# Patient Record
Sex: FEMALE | Race: ASIAN | NOT HISPANIC OR LATINO
[De-identification: names, ages, dates, MRNs, and addresses within clinical notes are randomized per-mention and may not be internally consistent; named-entity substitution may affect disease eponyms.]

---

## 2019-10-19 ENCOUNTER — IMAGING SERVICES (OUTPATIENT)
Dept: OTHER | Age: 44
End: 2019-10-19

## 2023-11-03 ENCOUNTER — OFFICE VISIT (OUTPATIENT)
Dept: RHEUMATOLOGY | Facility: CLINIC | Age: 48
End: 2023-11-03
Payer: COMMERCIAL

## 2023-11-03 VITALS
TEMPERATURE: 98 F | DIASTOLIC BLOOD PRESSURE: 66 MMHG | HEIGHT: 61 IN | RESPIRATION RATE: 16 BRPM | SYSTOLIC BLOOD PRESSURE: 108 MMHG | OXYGEN SATURATION: 99 % | BODY MASS INDEX: 29.83 KG/M2 | WEIGHT: 158 LBS | HEART RATE: 59 BPM

## 2023-11-03 DIAGNOSIS — E55.9 VITAMIN D DEFICIENCY: ICD-10-CM

## 2023-11-03 DIAGNOSIS — Z79.899 HIGH RISK MEDICATION USE: ICD-10-CM

## 2023-11-03 DIAGNOSIS — M05.9 SEROPOSITIVE RHEUMATOID ARTHRITIS (HCC): Primary | ICD-10-CM

## 2023-11-03 DIAGNOSIS — D84.821 IMMUNOCOMPROMISED STATE DUE TO DRUG THERAPY: ICD-10-CM

## 2023-11-03 DIAGNOSIS — I73.00 RAYNAUD'S DISEASE WITHOUT GANGRENE: ICD-10-CM

## 2023-11-03 DIAGNOSIS — Z79.899 IMMUNOCOMPROMISED STATE DUE TO DRUG THERAPY: ICD-10-CM

## 2023-11-03 DIAGNOSIS — R76.8 ANA POSITIVE: ICD-10-CM

## 2023-11-03 RX ORDER — METHOTREXATE 2.5 MG/1
15 TABLET ORAL WEEKLY
COMMUNITY
Start: 2023-10-24 | End: 2023-11-03

## 2023-11-03 RX ORDER — LEFLUNOMIDE 10 MG/1
10 TABLET ORAL DAILY
Qty: 90 TABLET | Refills: 0 | Status: SHIPPED | OUTPATIENT
Start: 2023-11-03

## 2023-11-03 RX ORDER — FOLIC ACID 1 MG/1
1 TABLET ORAL DAILY
COMMUNITY
Start: 2022-05-17 | End: 2023-11-03

## 2023-11-03 RX ORDER — FOLIC ACID 1 MG/1
1 TABLET ORAL DAILY
Qty: 90 TABLET | Refills: 0 | Status: SHIPPED | OUTPATIENT
Start: 2023-11-03

## 2023-11-29 ENCOUNTER — LAB ENCOUNTER (OUTPATIENT)
Dept: LAB | Facility: HOSPITAL | Age: 48
End: 2023-11-29
Attending: INTERNAL MEDICINE
Payer: COMMERCIAL

## 2023-11-29 DIAGNOSIS — Z79.899 HIGH RISK MEDICATION USE: ICD-10-CM

## 2023-11-29 DIAGNOSIS — R76.8 FALSE POSITIVE SEROLOGICAL TEST FOR SYPHILIS: ICD-10-CM

## 2023-11-29 DIAGNOSIS — M05.9 JUVENILE SEROPOSITIVE ARTHRITIS (HCC): Primary | ICD-10-CM

## 2023-11-29 DIAGNOSIS — Z79.899 NEED FOR PROPHYLACTIC CHEMOTHERAPY: ICD-10-CM

## 2023-11-29 DIAGNOSIS — E55.9 VITAMIN D DEFICIENCY: ICD-10-CM

## 2023-11-29 DIAGNOSIS — I73.00 RAYNAUD'S SYNDROME: ICD-10-CM

## 2023-11-29 DIAGNOSIS — M05.9 SEROPOSITIVE RHEUMATOID ARTHRITIS (HCC): ICD-10-CM

## 2023-11-29 LAB
ALBUMIN SERPL-MCNC: 3.5 G/DL (ref 3.4–5)
ALBUMIN/GLOB SERPL: 0.8 {RATIO} (ref 1–2)
ALP LIVER SERPL-CCNC: 61 U/L
ALT SERPL-CCNC: 27 U/L
ANION GAP SERPL CALC-SCNC: 2 MMOL/L (ref 0–18)
AST SERPL-CCNC: 21 U/L (ref 15–37)
BASOPHILS # BLD AUTO: 0.07 X10(3) UL (ref 0–0.2)
BASOPHILS NFR BLD AUTO: 0.9 %
BILIRUB SERPL-MCNC: 0.4 MG/DL (ref 0.1–2)
BUN BLD-MCNC: 8 MG/DL (ref 9–23)
CALCIUM BLD-MCNC: 9 MG/DL (ref 8.5–10.1)
CHLORIDE SERPL-SCNC: 107 MMOL/L (ref 98–112)
CO2 SERPL-SCNC: 31 MMOL/L (ref 21–32)
CREAT BLD-MCNC: 0.72 MG/DL
CRP SERPL-MCNC: <0.29 MG/DL (ref ?–0.3)
EGFRCR SERPLBLD CKD-EPI 2021: 103 ML/MIN/1.73M2 (ref 60–?)
EOSINOPHIL # BLD AUTO: 0.62 X10(3) UL (ref 0–0.7)
EOSINOPHIL NFR BLD AUTO: 8 %
ERYTHROCYTE [DISTWIDTH] IN BLOOD BY AUTOMATED COUNT: 12.7 %
ERYTHROCYTE [SEDIMENTATION RATE] IN BLOOD: 39 MM/HR
FASTING STATUS PATIENT QL REPORTED: YES
GLOBULIN PLAS-MCNC: 4.3 G/DL (ref 2.8–4.4)
GLUCOSE BLD-MCNC: 77 MG/DL (ref 70–99)
HCT VFR BLD AUTO: 39.4 %
HGB BLD-MCNC: 13.3 G/DL
IMM GRANULOCYTES # BLD AUTO: 0.02 X10(3) UL (ref 0–1)
IMM GRANULOCYTES NFR BLD: 0.3 %
LYMPHOCYTES # BLD AUTO: 1.56 X10(3) UL (ref 1–4)
LYMPHOCYTES NFR BLD AUTO: 20.2 %
MCH RBC QN AUTO: 29.1 PG (ref 26–34)
MCHC RBC AUTO-ENTMCNC: 33.8 G/DL (ref 31–37)
MCV RBC AUTO: 86.2 FL
MONOCYTES # BLD AUTO: 0.63 X10(3) UL (ref 0.1–1)
MONOCYTES NFR BLD AUTO: 8.1 %
NEUTROPHILS # BLD AUTO: 4.84 X10 (3) UL (ref 1.5–7.7)
NEUTROPHILS # BLD AUTO: 4.84 X10(3) UL (ref 1.5–7.7)
NEUTROPHILS NFR BLD AUTO: 62.5 %
OSMOLALITY SERPL CALC.SUM OF ELEC: 287 MOSM/KG (ref 275–295)
PLATELET # BLD AUTO: 343 10(3)UL (ref 150–450)
POTASSIUM SERPL-SCNC: 3.9 MMOL/L (ref 3.5–5.1)
PROT SERPL-MCNC: 7.8 G/DL (ref 6.4–8.2)
RBC # BLD AUTO: 4.57 X10(6)UL
SODIUM SERPL-SCNC: 140 MMOL/L (ref 136–145)
VIT B12 SERPL-MCNC: 536 PG/ML (ref 193–986)
VIT D+METAB SERPL-MCNC: 25.1 NG/ML (ref 30–100)
WBC # BLD AUTO: 7.7 X10(3) UL (ref 4–11)

## 2023-11-29 PROCEDURE — 85652 RBC SED RATE AUTOMATED: CPT | Performed by: INTERNAL MEDICINE

## 2023-11-29 PROCEDURE — 36415 COLL VENOUS BLD VENIPUNCTURE: CPT

## 2023-11-29 PROCEDURE — 36415 COLL VENOUS BLD VENIPUNCTURE: CPT | Performed by: INTERNAL MEDICINE

## 2023-11-29 PROCEDURE — 86140 C-REACTIVE PROTEIN: CPT | Performed by: INTERNAL MEDICINE

## 2023-11-29 PROCEDURE — 82607 VITAMIN B-12: CPT | Performed by: INTERNAL MEDICINE

## 2023-11-29 PROCEDURE — 85025 COMPLETE CBC W/AUTO DIFF WBC: CPT | Performed by: INTERNAL MEDICINE

## 2023-11-29 PROCEDURE — 82306 VITAMIN D 25 HYDROXY: CPT

## 2023-11-29 PROCEDURE — 80053 COMPREHEN METABOLIC PANEL: CPT | Performed by: INTERNAL MEDICINE

## 2023-11-30 DIAGNOSIS — Z79.899 IMMUNOCOMPROMISED STATE DUE TO DRUG THERAPY: ICD-10-CM

## 2023-11-30 DIAGNOSIS — E55.9 VITAMIN D DEFICIENCY: ICD-10-CM

## 2023-11-30 DIAGNOSIS — R70.0 ELEVATED SED RATE: ICD-10-CM

## 2023-11-30 DIAGNOSIS — M05.9 SEROPOSITIVE RHEUMATOID ARTHRITIS (HCC): Primary | ICD-10-CM

## 2023-11-30 DIAGNOSIS — D84.821 IMMUNOCOMPROMISED STATE DUE TO DRUG THERAPY: ICD-10-CM

## 2023-11-30 DIAGNOSIS — Z79.899 HIGH RISK MEDICATION USE: ICD-10-CM

## 2023-11-30 RX ORDER — ERGOCALCIFEROL 1.25 MG/1
50000 CAPSULE ORAL WEEKLY
Qty: 12 CAPSULE | Refills: 0 | Status: SHIPPED | OUTPATIENT
Start: 2023-11-30 | End: 2024-02-22

## 2023-12-04 DIAGNOSIS — E55.9 VITAMIN D DEFICIENCY: Primary | ICD-10-CM

## 2023-12-18 ENCOUNTER — TELEPHONE (OUTPATIENT)
Dept: RHEUMATOLOGY | Facility: CLINIC | Age: 48
End: 2023-12-18

## 2024-02-22 DIAGNOSIS — E55.9 VITAMIN D DEFICIENCY: ICD-10-CM

## 2024-03-18 RX ORDER — ERGOCALCIFEROL 1.25 MG/1
50000 CAPSULE ORAL WEEKLY
Qty: 12 CAPSULE | Refills: 0 | OUTPATIENT
Start: 2024-03-18

## 2024-03-18 NOTE — TELEPHONE ENCOUNTER
Pt responded. Vitamin D Lab still not completed at this time. Refusing fill until Vitamin D lab is resulted.

## 2024-05-13 ENCOUNTER — OFFICE VISIT (OUTPATIENT)
Dept: RHEUMATOLOGY | Facility: CLINIC | Age: 49
End: 2024-05-13
Payer: COMMERCIAL

## 2024-05-13 VITALS
RESPIRATION RATE: 16 BRPM | HEART RATE: 78 BPM | HEIGHT: 59.45 IN | OXYGEN SATURATION: 99 % | BODY MASS INDEX: 30.44 KG/M2 | DIASTOLIC BLOOD PRESSURE: 68 MMHG | SYSTOLIC BLOOD PRESSURE: 108 MMHG | WEIGHT: 153 LBS | TEMPERATURE: 98 F

## 2024-05-13 DIAGNOSIS — M05.9 SEROPOSITIVE RHEUMATOID ARTHRITIS (HCC): Primary | ICD-10-CM

## 2024-05-13 DIAGNOSIS — E55.9 VITAMIN D DEFICIENCY: ICD-10-CM

## 2024-05-13 DIAGNOSIS — R76.0 ANTIPHOSPHOLIPID ANTIBODY POSITIVE: ICD-10-CM

## 2024-05-13 DIAGNOSIS — I73.00 RAYNAUD'S DISEASE WITHOUT GANGRENE: ICD-10-CM

## 2024-05-13 PROBLEM — M05.79 RHEUMATOID ARTHRITIS INVOLVING MULTIPLE SITES WITH POSITIVE RHEUMATOID FACTOR (HCC): Status: ACTIVE | Noted: 2023-07-17

## 2024-05-13 PROCEDURE — 99215 OFFICE O/P EST HI 40 MIN: CPT | Performed by: INTERNAL MEDICINE

## 2024-05-13 RX ORDER — HYDROXYCHLOROQUINE SULFATE 200 MG/1
200 TABLET, FILM COATED ORAL DAILY
Qty: 90 TABLET | Refills: 1 | Status: SHIPPED | OUTPATIENT
Start: 2024-05-13

## 2024-05-13 NOTE — PROGRESS NOTES
?  RHEUMATOLOGY FOLLOW UP   Date of visit: 05/13/2024  ?  Chief Complaint   Patient presents with    Rheumatoid Arthritis     6 month f/u. No joint pain or swelling. No stiffness in the morning. Stopped leflunomine about two months ago. Converted rapid sore of 0.3       ?  ASSESSMENT, DISCUSSION & PLAN   Assessment:  1. Seropositive rheumatoid arthritis (HCC)    2. Vitamin D deficiency    3. Raynaud's disease without gangrene    4. Antiphospholipid antibody positive        Discussion:  Mrs. Arabella Manzano is a 49 yo woman with recently diagnosed seropositive rheumatoid arthritis (RF+/CCP+) in 2022 but symptoms starting in 2021, on methotrexate. She still has signs and symptoms consistent with active rheumatoid arthritis.  Patient was hesitant to increase methotrexate further due to potential side effects.  Felt like she actually gets worsened pain 24 to 48 hours after taking the medication.    Had recommended leflunomide 10mg daily for 3 months but did not get labs done and was lost to follow up. Did have some scapular pain that resolved when stopping medication.  She is still very hesitant to start medication but has signs of b/l middle finger MCP enlargement and some early swan neck deformities.  Strongly recommended medication intervention and reminded that without medication, there will likely be irreversible damage that occurs.      -We discussed the risks and benefits of Plaquenil therapy.  -Plaquenil is an antimalarial medication used for its anti-inflammatory properties in systemic lupus erythematosus for the reduction of flares and complications associated with the disease. This is also been demonstrated to help improve arthropathies. Side effects of Plaquenil include retinal deposition, which may reflect unchecked, may lead to visual changes. This was discussed as was the recommendation that an ophthalmologist be seen every 6-12 months for evaluation. Other side effects include gastro  intestinal upset, including nausea, vomiting, and loose stools. Rarely, Plaquenil can also be associated with myopathy. This was discussed in detail, and it was agreed that the benefit of this medication outweighs its risk.  She seems open to trying this medicine although still hesitant.  Will start at just 200 mg daily although she would likely benefit from increasing to weight-based dosing  Will have her get updated labs.  Previously ESR was elevated and vitamin D was low.  Unclear if patient ever took the prescription.  She states she is not taking current vitamin D.    ANGELICA/ROXIE panel was grossly negative.  Recommended patient be seen by dermatology for the rash over her cheeks.  Discussed that this could be rosacea or potentially cutaneous lupus without serology positivity.  Of note, she did have a beta-2 glycoprotein IgM that was equivocal.  Will repeat with getting updated labs.    She will need follow-up in 3 months or sooner as needed.      -Rheumatoid arthritis is a severe, potentially life-threatening inflammatory arthropathy. In addition to affecting the joints and muscles leading to potential destruction of the joints and possible disability, rheumatoid arthritis also affects many other systems in the body. In particular, the patient is at significantly high-risk for coronary vascular disease. Patients with rheumatoid arthritis die at an average 8-10 years earlier than their age-matched normal because of associated accelerated atherosclerosis. In addition, patients are at much higher risk for interstitial lung disease and ophthalmologic complications, including scleromalacia. Neuropathy can also be present along, with a variety of skin lesions. The patient will need close follow up by our Rheumatology Department to monitor their current medications and also any progression of disease.  -Aggressive interventions have been shown to be helpful in reducing the potential complications including disability.  Most recently, aggressive control of Rheumatoid Arthritis has been demonstrated to actually reduce the frequency of Cardiovascular Disease Related events.  -Patients require significant monitoring due to the high risk of side effects associated with these medications including malignancy and infection.     Patient verbalized understanding of above instructions. No further questions at this time.    Code selection for this visit was based on time spent (40min) on date of service in preparing to see the patient, obtaining and/or reviewing separately obtained history, performing a medically appropriate examination, counseling and educating the patient/family/caregiver, ordering medications or testing, referring and communicating with other healthcare providers, documenting clinical information in the E HR, independently interpreting results and communicating results to the patient/family/caregiver and care coordination with the patient's other providers.    ?  Plan:  Diagnoses and all orders for this visit:    Seropositive rheumatoid arthritis (HCC)  -     hydroxychloroquine 200 MG Oral Tab; Take 1 tablet (200 mg total) by mouth daily.    Vitamin D deficiency    Raynaud's disease without gangrene  -     Antiphospholipid Syndrome (APS) Profile; Future    Antiphospholipid antibody positive  -     Antiphospholipid Syndrome (APS) Profile; Future          Return in about 3 months (around 8/13/2024).  ?  HPI   Arabella Manzano is a 48 year old female with the following active problems who is referred for rheumatologic evaluation due to another opinion regarding her RA as well as location of office. Has known seropositive RA on methotrexate.     Stopped methotrexate at last visit and took lower dose of the leflunomide for 3 months  Did not get labs done as recommended.   Developed back pain while on the medication within 2 weeks. Pain resolved after stopping medication. Thinks more over the shoulder blade.   Did  not notice a difference in the pain or symptoms.   Was doing exercises as well.    Has been off medications for two months and denies any current pain.   Has pain intermittently around her menstrual cycles for 2-3 days- typically the hands (points over the MCPs)  Denies current wrists and ankles.   Denies swelling of the joints   Denies hair loss at this time  Still with redness over the face. Has not seen dermatology yet.   Denies significant raynauds this year due to mild winter         HPI from initial consultation  referred for rheumatologic evaluation due to another opinion regarding her RA as well as location of office. Has known seropositive RA on methotrexate.     First started to get pain in the both feet and over the right wrist with swelling and right ankle with swelling.   Was seen at urgent care initially and had xrays and told likely related to an injury (although pt declines injury).  Over the following 6 months, pain worsened and difficulty ambulating to severity. No swelling. But significant morning stiffness.  Eventually seen by [facility] and referred to rheumatology.   As part of the work up, was found to have (RF/CCP+). Then started on methotrexate in 2022- 6 tabs weekly with daily folic acid.   Never given steroids  Never any injections of the joints.   Transitioned of care to [facility]  Also started exercising more with yoga.     Feels worsened pain after taking methotrexate in the fingers that last 24-48 hours after taking medication.   No longer have wrist pain.   Difficulty making a fist, particularly left index finger and right pinky finger   Some shoulder pain last year, since resolved.     + hair loss/thinning   + redness over face with winter weather. Not with sun exposure.   + raynaud's possible with color change to purple with cold exposure   + sensitivity over tip of left index finger, was on right previously   + 1 m ago, right Achilles heel pain without swelling or visual ball but had  ball like sensation there       Hx of one miscarriage, during 1st trimester. Able to conceive afterwards, both via    Hx of cavities requiring root canal   Hx of intermittent constipation, worsened with methotrexate. Told to increase fiber 3 days before taknig medication       Denies current morning stiffness  Denies any pain in elbows, knees, hips.   Denies new skin nodule formation.  The patient denies oral or nasal ulcers, photosensitive rash, elevated or scarring rashes, prior hematologic abnormality, prior renal or liver disease, or history of seizures.  No history of prior blood clot in the legs or lungs, strokes or ischemic phenomenon.  Denies tightening of the skin, nonhealing ulcers on the fingertips, trouble swallowing, or severe acid reflux.  The patient denies any history of uveitis, crampy abdominal pain, diarrhea, bloody stools,  nodular painful shin bruises, psoriatic lesions, spooning or pitting of the nails, history of dactylitis, intermittent buttock pain, or pain awakening the patient from sleep.  There are no symptoms of severe dry eyes, dry mouth, recurrent cavities, or swelling of the cheeks or under the jawbone.   No fevers, chills, lymphadenopathy, night sweats, unexpected weight loss, bony pain, easy bruising or bleeding, or unexplained weakness.  Denies chronic sinus infections/disease or epistaxis.  Denies chronic cough or hemoptysis.           Past Medical History:  History reviewed. No pertinent past medical history.  Past Surgical History:  History reviewed. No pertinent surgical history.  Family History:  History reviewed. No pertinent family history.  Social History:  Social History     Socioeconomic History    Marital status:    Tobacco Use    Smoking status: Never    Smokeless tobacco: Never   Substance and Sexual Activity    Alcohol use: Never    Drug use: Never     Medications:  Outpatient Medications Marked as Taking for the 24 encounter (Office Visit) with  Rashmi Spring, DO   Medication Sig Dispense Refill    hydroxychloroquine 200 MG Oral Tab Take 1 tablet (200 mg total) by mouth daily. 90 tablet 1     Modified Medications    No medications on file     Medications Discontinued During This Encounter   Medication Reason    folic acid 1 MG Oral Tab     leflunomide 10 MG Oral Tab      ?  ?  Allergies:  No Known Allergies  ?  REVIEW OF SYSTEMS   ?  Review of Systems   Constitutional:  Positive for malaise/fatigue (intermittently). Negative for chills, fever and weight loss.   Eyes:  Negative for pain and redness.   Respiratory:  Positive for cough (recovering from virs). Negative for hemoptysis and shortness of breath.    Cardiovascular:  Positive for leg swelling (around her menstrual cycle). Negative for chest pain and palpitations.   Gastrointestinal:  Positive for constipation. Negative for abdominal pain, blood in stool, diarrhea, heartburn and nausea.   Genitourinary:  Negative for dysuria, frequency, hematuria and urgency.   Musculoskeletal:  Positive for joint pain. Negative for back pain, myalgias and neck pain.   Skin:  Positive for rash (with burning sensation). Negative for itching.   Neurological:  Positive for sensory change (with cold exposure). Negative for dizziness, tingling, seizures, weakness and headaches.   Endo/Heme/Allergies:  Negative for environmental allergies. Does not bruise/bleed easily.   Psychiatric/Behavioral:  Negative for depression. The patient is not nervous/anxious and does not have insomnia.      PHYSICAL EXAM   Today's Vitals:  Temperature Blood Pressure Heart Rate Resp Rate SpO2   Temp: 98.4 °F (36.9 °C) BP: 108/68 Pulse: 78 Resp: 16 SpO2: 99 %   ?  Current Weight Height BMI BSA Pain   Wt Readings from Last 1 Encounters:   05/13/24 153 lb (69.4 kg)    Height: 4' 11.45\" (151 cm) Body mass index is 30.44 kg/m². Body surface area is 1.65 meters squared.         Physical Exam  Vitals and nursing note reviewed.   Constitutional:        General: She is not in acute distress.     Appearance: She is well-developed. She is not diaphoretic.   HENT:      Head: Normocephalic.   Eyes:      General: No scleral icterus.     Extraocular Movements: Extraocular movements intact.      Conjunctiva/sclera: Conjunctivae normal.   Neck:      Vascular: No JVD.      Trachea: No tracheal deviation.   Cardiovascular:      Rate and Rhythm: Normal rate and regular rhythm.      Heart sounds: Normal heart sounds. No murmur heard.  Pulmonary:      Effort: Pulmonary effort is normal. No respiratory distress.      Breath sounds: Normal breath sounds. No wheezing.   Musculoskeletal:         General: Swelling, tenderness and deformity present.      Cervical back: Neck supple.      Comments: Right greater than left index DIP nodularities  Tailors bunion on left foot -stable  Bilateral middle finger MCP enlargement and swelling and tenderness.  Left index MCP slight swelling and no tenderness  Early swan-neck deformities of multiple fingers  No swelling, tenderness, redness or restriction of motion of the DIPs, PIPs, remaining MCPs, wrists, elbows, ankles, or joints of the feet.  Bilateral shoulders with full ROM, no evidence of impingement with provocative maneuvers.  Bilateral knees without medial joint line tenderness, no crepitus, no effusion.   Lymphadenopathy:      Cervical: No cervical adenopathy.   Skin:     General: Skin is warm and dry.      Findings: Erythema and rash present.      Comments: Some redness over cheeks-with raised bumps.  Crosses over nasolabial folds  No periungal erythema  No fingernail pitting/onycholysis    Neurological:      Mental Status: She is alert and oriented to person, place, and time.      Cranial Nerves: No cranial nerve deficit.      Gait: Gait normal.   Psychiatric:         Mood and Affect: Mood normal.         Behavior: Behavior normal.         Radiology review:      CLINICAL HISTORY: M79.641: Pain in both hands  M79.642: Pain in both  hands  M79.671: Bilateral foot pain  M79.672: Bilateral foot pain    TECHNIQUE:  3 views of the left foot, 3 views of the right foot, 3 views of the left hand, performed on 05/16/2022    No prior studies available for comparison    The left hand films demonstrate normal bone density for age. There is mild sclerosis about the radial scaphoid, triscaphe, first CMC, DIP joints. There are no erosions. There is no chondrocalcinosis. There is mild soft tissue swelling about the index finger. There is no fracture or subluxation.    The foot films demonstrate very mild sclerosis and narrowing of the tarsometatarsal, first MTP and IP joints bilaterally. There are no erosions. There is no chondrocalcinosis. There is no acute fracture or subluxation. There is mild plantar and Achilles calcaneal spur formation on the left.    IMPRESSION  IMPRESSION:    1. 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    2. Mild bilateral osteoarthritis of the feet without an erosive or crystal component evident.      Recommendation: n/a  Electronically Verified and Signed by Attending Radiologist: Martin Lazarus MD 5/16/2022 5:03 PM  This exam was dictated at [facility].  Exam End: 05/16/22 12:29 PM    Specimen Collected: 05/16/22  4:59 PM Last Resulted: 05/16/22  5:03 PM     Labs:  Lab Results   Component Value Date    WBC 7.7 11/29/2023    RBC 4.57 11/29/2023    HGB 13.3 11/29/2023    HCT 39.4 11/29/2023    .0 11/29/2023    MCV 86.2 11/29/2023    MCH 29.1 11/29/2023    MCHC 33.8 11/29/2023    RDW 12.7 11/29/2023    NEPRELIM 4.84 11/29/2023    NEPERCENT 62.5 11/29/2023    LYPERCENT 20.2 11/29/2023    MOPERCENT 8.1 11/29/2023    EOPERCENT 8.0 11/29/2023    BAPERCENT 0.9 11/29/2023    NE 4.84 11/29/2023    LYMABS 1.56 11/29/2023    MOABSO 0.63 11/29/2023    EOABSO 0.62 11/29/2023    BAABSO 0.07 11/29/2023     Lab Results   Component Value Date    GLU 77 11/29/2023    BUN 8  (L) 11/29/2023    CREATSERUM 0.72 11/29/2023    ANIONGAP 2 11/29/2023    CA 9.0 11/29/2023    OSMOCALC 287 11/29/2023    ALKPHO 61 11/29/2023    AST 21 11/29/2023    ALT 27 11/29/2023    BILT 0.4 11/29/2023    TP 7.8 11/29/2023    ALB 3.5 11/29/2023    GLOBULIN 4.3 11/29/2023     11/29/2023    K 3.9 11/29/2023     11/29/2023    CO2 31.0 11/29/2023       Additional Labs:    11/2023  CBC grossly normal  CMP grossly normal  ESR 39 elevated  CRP normal  B12 536 normal  Vitamin D 25.1 low  AVISE with RF IgM, IgA and positive; CCP strong positive; B2 G IgM equivocal otherwise grossly negative antibody panel.  Methotrexate levels intermediate (43.5 nmol/L per evaluation recommends more exposure to methotrexate.    04/2023  C3 135 normal  C4 25 normal   CRP 0.47 (N<0.5)   ESR 33 elevated     11/2022  ANGELICA 1:160 homogenous and 1:80 speckled     05/2022  ESR 41 elevated  hsCRP 5.3 (N<3) elevated   .1 high positive   .2 high positive   dsDNA, chromatin, ribosomal P, SSA, SSB, centromere, Sm, RNP/SM, RNP, Scl70, Jo1 negative  Vit D 17 low   Hep B serologies nonreactive  HCV nonreactive           Rashmi Spring DO  [facility]  05/13/2024

## 2024-05-14 DIAGNOSIS — E55.9 VITAMIN D DEFICIENCY: ICD-10-CM

## 2024-05-17 RX ORDER — ERGOCALCIFEROL 1.25 MG/1
50000 CAPSULE ORAL WEEKLY
Qty: 12 CAPSULE | Refills: 0 | OUTPATIENT
Start: 2024-05-17

## 2024-05-21 ENCOUNTER — LAB ENCOUNTER (OUTPATIENT)
Dept: LAB | Age: 49
End: 2024-05-21
Attending: INTERNAL MEDICINE

## 2024-05-21 DIAGNOSIS — Z79.899 HIGH RISK MEDICATION USE: ICD-10-CM

## 2024-05-21 DIAGNOSIS — E55.9 VITAMIN D DEFICIENCY: ICD-10-CM

## 2024-05-21 DIAGNOSIS — M05.9 SEROPOSITIVE RHEUMATOID ARTHRITIS (HCC): ICD-10-CM

## 2024-05-21 DIAGNOSIS — R70.0 ELEVATED SED RATE: ICD-10-CM

## 2024-05-21 DIAGNOSIS — R76.0 ANTIPHOSPHOLIPID ANTIBODY POSITIVE: ICD-10-CM

## 2024-05-21 DIAGNOSIS — I73.00 RAYNAUD'S DISEASE WITHOUT GANGRENE: ICD-10-CM

## 2024-05-21 LAB
ALBUMIN SERPL-MCNC: 3.4 G/DL (ref 3.4–5)
ALBUMIN/GLOB SERPL: 0.9 {RATIO} (ref 1–2)
ALP LIVER SERPL-CCNC: 62 U/L
ALT SERPL-CCNC: 17 U/L
ANION GAP SERPL CALC-SCNC: 3 MMOL/L (ref 0–18)
AST SERPL-CCNC: 8 U/L (ref 15–37)
BASOPHILS # BLD AUTO: 0.05 X10(3) UL (ref 0–0.2)
BASOPHILS NFR BLD AUTO: 0.7 %
BILIRUB SERPL-MCNC: 0.4 MG/DL (ref 0.1–2)
BUN BLD-MCNC: 5 MG/DL (ref 9–23)
CALCIUM BLD-MCNC: 9.1 MG/DL (ref 8.5–10.1)
CHLORIDE SERPL-SCNC: 108 MMOL/L (ref 98–112)
CO2 SERPL-SCNC: 29 MMOL/L (ref 21–32)
CREAT BLD-MCNC: 0.63 MG/DL
EGFRCR SERPLBLD CKD-EPI 2021: 109 ML/MIN/1.73M2 (ref 60–?)
EOSINOPHIL # BLD AUTO: 0.8 X10(3) UL (ref 0–0.7)
EOSINOPHIL NFR BLD AUTO: 10.7 %
ERYTHROCYTE [DISTWIDTH] IN BLOOD BY AUTOMATED COUNT: 12.9 %
ERYTHROCYTE [SEDIMENTATION RATE] IN BLOOD: 28 MM/HR
FASTING STATUS PATIENT QL REPORTED: NO
GLOBULIN PLAS-MCNC: 4 G/DL (ref 2.8–4.4)
GLUCOSE BLD-MCNC: 90 MG/DL (ref 70–99)
HCT VFR BLD AUTO: 36 %
HGB BLD-MCNC: 12 G/DL
IMM GRANULOCYTES # BLD AUTO: 0.02 X10(3) UL (ref 0–1)
IMM GRANULOCYTES NFR BLD: 0.3 %
LYMPHOCYTES # BLD AUTO: 2.21 X10(3) UL (ref 1–4)
LYMPHOCYTES NFR BLD AUTO: 29.6 %
MCH RBC QN AUTO: 28.6 PG (ref 26–34)
MCHC RBC AUTO-ENTMCNC: 33.3 G/DL (ref 31–37)
MCV RBC AUTO: 85.7 FL
MONOCYTES # BLD AUTO: 0.49 X10(3) UL (ref 0.1–1)
MONOCYTES NFR BLD AUTO: 6.6 %
NEUTROPHILS # BLD AUTO: 3.9 X10 (3) UL (ref 1.5–7.7)
NEUTROPHILS # BLD AUTO: 3.9 X10(3) UL (ref 1.5–7.7)
NEUTROPHILS NFR BLD AUTO: 52.1 %
OSMOLALITY SERPL CALC.SUM OF ELEC: 287 MOSM/KG (ref 275–295)
PLATELET # BLD AUTO: 347 10(3)UL (ref 150–450)
POTASSIUM SERPL-SCNC: 4 MMOL/L (ref 3.5–5.1)
PROT SERPL-MCNC: 7.4 G/DL (ref 6.4–8.2)
RBC # BLD AUTO: 4.2 X10(6)UL
SODIUM SERPL-SCNC: 140 MMOL/L (ref 136–145)
VIT D+METAB SERPL-MCNC: 31.5 NG/ML (ref 30–100)
WBC # BLD AUTO: 7.5 X10(3) UL (ref 4–11)

## 2024-05-21 PROCEDURE — 85652 RBC SED RATE AUTOMATED: CPT

## 2024-05-21 PROCEDURE — 86146 BETA-2 GLYCOPROTEIN ANTIBODY: CPT

## 2024-05-21 PROCEDURE — 85610 PROTHROMBIN TIME: CPT

## 2024-05-21 PROCEDURE — 36415 COLL VENOUS BLD VENIPUNCTURE: CPT

## 2024-05-21 PROCEDURE — 80053 COMPREHEN METABOLIC PANEL: CPT

## 2024-05-21 PROCEDURE — 82306 VITAMIN D 25 HYDROXY: CPT

## 2024-05-21 PROCEDURE — 85025 COMPLETE CBC W/AUTO DIFF WBC: CPT

## 2024-05-21 PROCEDURE — 86147 CARDIOLIPIN ANTIBODY EA IG: CPT

## 2024-05-24 LAB
APTT: 27.3 SEC
B2 GLYCOPROT I IGG AB: <9 GPI IGG UNITS
B2 GLYCOPROT I IGM AB: <9 GPI IGM UNITS
CARDIOLIPIN IGG: <9 GPL U/ML
CARDIOLIPIN IGM: 18 MPL U/ML
DRVVT: 36.6 SEC
HEXAGONAL PHASE PHOSPHOLIPID: 5 SEC
INR: 1
PT: 10.6 SEC
THROMBIN TIME: 19.1 SEC

## 2024-08-12 ENCOUNTER — OFFICE VISIT (OUTPATIENT)
Dept: RHEUMATOLOGY | Facility: CLINIC | Age: 49
End: 2024-08-12
Payer: COMMERCIAL

## 2024-08-12 VITALS
HEIGHT: 60 IN | HEART RATE: 68 BPM | WEIGHT: 152 LBS | BODY MASS INDEX: 29.84 KG/M2 | SYSTOLIC BLOOD PRESSURE: 114 MMHG | RESPIRATION RATE: 16 BRPM | TEMPERATURE: 98 F | OXYGEN SATURATION: 99 % | DIASTOLIC BLOOD PRESSURE: 64 MMHG

## 2024-08-12 DIAGNOSIS — R53.82 CHRONIC FATIGUE: ICD-10-CM

## 2024-08-12 DIAGNOSIS — H04.123 DRY EYE SYNDROME OF BOTH EYES: ICD-10-CM

## 2024-08-12 DIAGNOSIS — M05.9 SEROPOSITIVE RHEUMATOID ARTHRITIS (HCC): Primary | ICD-10-CM

## 2024-08-12 DIAGNOSIS — G89.29 CHRONIC MIDLINE THORACIC BACK PAIN: ICD-10-CM

## 2024-08-12 DIAGNOSIS — L65.9 HAIR THINNING: ICD-10-CM

## 2024-08-12 DIAGNOSIS — M54.6 CHRONIC MIDLINE THORACIC BACK PAIN: ICD-10-CM

## 2024-08-12 DIAGNOSIS — R76.0 ANTIPHOSPHOLIPID ANTIBODY POSITIVE: ICD-10-CM

## 2024-08-12 DIAGNOSIS — I73.00 RAYNAUD'S DISEASE WITHOUT GANGRENE: ICD-10-CM

## 2024-08-12 DIAGNOSIS — E55.9 VITAMIN D DEFICIENCY: ICD-10-CM

## 2024-08-12 DIAGNOSIS — Z79.899 LONG-TERM USE OF PLAQUENIL: ICD-10-CM

## 2024-08-12 PROCEDURE — G2211 COMPLEX E/M VISIT ADD ON: HCPCS | Performed by: INTERNAL MEDICINE

## 2024-08-12 PROCEDURE — 99214 OFFICE O/P EST MOD 30 MIN: CPT | Performed by: INTERNAL MEDICINE

## 2024-08-12 NOTE — PROGRESS NOTES
?  RHEUMATOLOGY FOLLOW UP   Date of visit: 08/12/2024  ?  Chief Complaint   Patient presents with    Rheumatoid Arthritis     3 month f/u. Feeling good. No joint pain or swelling. No new symptoms. Converted rapid score of zero       ?  ASSESSMENT, DISCUSSION & PLAN   Assessment:  1. Seropositive rheumatoid arthritis (HCC)    2. Vitamin D deficiency    3. Raynaud's disease without gangrene    4. Antiphospholipid antibody positive    5. Long-term use of Plaquenil    6. Hair thinning    7. Chronic fatigue    8. Dry eye syndrome of both eyes    9. Chronic midline thoracic back pain          Discussion:  Mrs. Arabella Manzano is a 50 yo woman with recently diagnosed seropositive rheumatoid arthritis (RF+/CCP+) in 2022 but symptoms starting in 2021, on methotrexate. She still has signs and symptoms consistent with active rheumatoid arthritis.  Patient was hesitant to increase methotrexate further due to potential side effects.  Felt like she actually gets worsened pain 24 to 48 hours after taking the medication.    Had recommended leflunomide 10mg daily for 3 months but did not get labs done and was lost to follow up. Did have some scapular pain that resolved when stopping medication.  She is still very hesitant to start medication but has signs of b/l middle finger MCP enlargement and some early swan neck deformities.  Strongly recommended medication intervention and reminded that without medication, there will likely be irreversible damage that occurs.  She has been taking hydroxychloroquine consistently and feels helping. She is still hesitant about being on medication.     -- get updated labs (no fasting required)  -- continue hydroxychloroquine 200mg daily (remember, based off your weight, we may consider increasing in the future if the symptoms worsen)  -- remember to get updated eye exam (this needs to be annual to monitor for toxicities from the medication)  -- get xrays of the back to evaluate for  scoliosis, will consider physical therapy  -- for the dry eyes, try warm compresses or OTC drops like systane, refresh, theratears or blink drops  -- okay to try biofreeze or voltaren (diclofenac) gel  -- for the constipation, okay to take metamucil, miralax or dulcolax as needed but check if there are more changes in diet (water intake, probiotics/yogurt, and fiber)   -- follow up in 4 months or sooner   -- call/message with questions/concerns      -We discussed the risks and benefits of Plaquenil therapy.  -Plaquenil is an antimalarial medication used for its anti-inflammatory properties in systemic lupus erythematosus for the reduction of flares and complications associated with the disease. This is also been demonstrated to help improve arthropathies. Side effects of Plaquenil include retinal deposition, which may reflect unchecked, may lead to visual changes. This was discussed as was the recommendation that an ophthalmologist be seen every 6-12 months for evaluation. Other side effects include gastro intestinal upset, including nausea, vomiting, and loose stools. Rarely, Plaquenil can also be associated with myopathy. This was discussed in detail, and it was agreed that the benefit of this medication outweighs its risk.  She seems open to trying this medicine although still hesitant.  Will start at just 200 mg daily although she would likely benefit from increasing to weight-based dosing  Will have her get updated labs.  Previously ESR was elevated and vitamin D was low.  Unclear if patient ever took the prescription.  She states she is not taking current vitamin D.    ANGELICA/ROXIE panel was grossly negative.  Recommended patient be seen by dermatology for the rash over her cheeks.  Previously discussed that this could be rosacea or potentially cutaneous lupus without serology positivity.  Of note, she did have a beta-2 glycoprotein IgM that was equivocal and on repeat testing, looked like aCL borderline positive.    Will repeat with getting updated labs.    She will need follow-up in 4 months or sooner as needed.      -Rheumatoid arthritis is a severe, potentially life-threatening inflammatory arthropathy. In addition to affecting the joints and muscles leading to potential destruction of the joints and possible disability, rheumatoid arthritis also affects many other systems in the body. In particular, the patient is at significantly high-risk for coronary vascular disease. Patients with rheumatoid arthritis die at an average 8-10 years earlier than their age-matched normal because of associated accelerated atherosclerosis. In addition, patients are at much higher risk for interstitial lung disease and ophthalmologic complications, including scleromalacia. Neuropathy can also be present along, with a variety of skin lesions. The patient will need close follow up by our Rheumatology Department to monitor their current medications and also any progression of disease.  -Aggressive interventions have been shown to be helpful in reducing the potential complications including disability. Most recently, aggressive control of Rheumatoid Arthritis has been demonstrated to actually reduce the frequency of Cardiovascular Disease Related events.  -Patients require significant monitoring due to the high risk of side effects associated with these medications including malignancy and infection.     Patient verbalized understanding of above instructions. No further questions at this time.    Code selection for this visit was based on time spent (35min) on date of service in preparing to see the patient, obtaining and/or reviewing separately obtained history, performing a medically appropriate examination, counseling and educating the patient/family/caregiver, ordering medications or testing, referring and communicating with other healthcare providers, documenting clinical information in the E HR, independently interpreting results and  communicating results to the patient/family/caregiver and care coordination with the patient's other providers.    ?  Plan:  Diagnoses and all orders for this visit:    Seropositive rheumatoid arthritis (HCC)  -     CBC With Differential With Platelet  -     Sed Rate, Westergren (Automated)  -     C-Reactive Protein  -     TSH and Free T4 [E]  -     Vitamin B12  -     Iron And Tibc  -     Ferritin  -     Vitamin D; Future    Vitamin D deficiency  -     Vitamin D; Future    Raynaud's disease without gangrene    Antiphospholipid antibody positive    Long-term use of Plaquenil    Hair thinning  -     CBC With Differential With Platelet  -     Sed Rate, Westergren (Automated)  -     C-Reactive Protein  -     TSH and Free T4 [E]  -     Vitamin B12  -     Iron And Tibc  -     Ferritin  -     Vitamin D; Future    Chronic fatigue  -     CBC With Differential With Platelet  -     Sed Rate, Westergren (Automated)  -     C-Reactive Protein  -     TSH and Free T4 [E]  -     Vitamin B12  -     Iron And Tibc  -     Ferritin  -     Vitamin D; Future    Dry eye syndrome of both eyes    Chronic midline thoracic back pain  -     XR SCOLIOSIS SPINE 2-3 VIEWS (CPT=72082); Future            Return in about 4 months (around 12/12/2024).  ?  HPI   Arabella Manzano is a 49 year old female with the following active problems who is referred for rheumatologic evaluation due to another opinion regarding her RA as well as location of office. Has known seropositive RA on methotrexate. She had side effects so was transitioned to plaquenil and presents for follow up.   She presents with her elder daughter today.     She feels well overall  Denies current joint pain or swelling.  Can get some pain in the left middle/pinky finger PIP without swelling. Resolves on it's own or with some exercises/massage. Never lasts more than one day. Also in wrists occasionally.   Denies morning stiffness.   Has been taking hcq daily.   Last eye exam  while in [place] in 2020 and told normal exam.     + dry eyes, not doing anything  Denies significant dry mouth  Denies nasal or oral ulcers  Denies skin rashes. Denies sun sensitivity.     + back pain- felt more in the shoulders and spine diffusely. Not daily. Worsened after working around the house. Feels better after resting for 3 hours and then feels better.   Denies numbness/tingling.     Stopped methotrexate at last visit and took lower dose of the leflunomide for 3 months  Did not get labs done as recommended.   Developed back pain while on the medication within 2 weeks. Pain resolved after stopping medication. Thinks more over the shoulder blade.   Did not notice a difference in the pain or symptoms.   Was doing exercises as well.    Has been off medications for two months and denies any current pain.   Has pain intermittently around her menstrual cycles for 2-3 days- typically the hands (points over the MCPs)  Denies current wrists and ankles.   + increased hair loss, possible spot of loss over both sides. Noticed more recently.   Denies significant raynauds this year due to mild winter and no ulcers         HPI from initial consultation  referred for rheumatologic evaluation due to another opinion regarding her RA as well as location of office. Has known seropositive RA on methotrexate.     First started to get pain in the both feet and over the right wrist with swelling and right ankle with swelling.   Was seen at urgent care initially and had xrays and told likely related to an injury (although pt declines injury).  Over the following 6 months, pain worsened and difficulty ambulating to severity. No swelling. But significant morning stiffness.  Eventually seen by [facility] and referred to rheumatology.   As part of the work up, was found to have (RF/CCP+). Then started on methotrexate in 2022- 6 tabs weekly with daily folic acid.   Never given steroids  Never any injections of the joints.   Transitioned of  care to [facility]  Also started exercising more with yoga.     Feels worsened pain after taking methotrexate in the fingers that last 24-48 hours after taking medication.   No longer have wrist pain.   Difficulty making a fist, particularly left index finger and right pinky finger   Some shoulder pain last year, since resolved.     + hair loss/thinning   + redness over face with winter weather. Not with sun exposure.   + raynaud's possible with color change to purple with cold exposure   + sensitivity over tip of left index finger, was on right previously   + 1 m ago, right Achilles heel pain without swelling or visual ball but had ball like sensation there       Hx of one miscarriage, during 1st trimester. Able to conceive afterwards, both via    Hx of cavities requiring root canal   Hx of intermittent constipation, worsened with methotrexate. Told to increase fiber 3 days before taknig medication     Denies current morning stiffness  Denies any pain in elbows, knees, hips.   Denies new skin nodule formation.  The patient denies oral or nasal ulcers, photosensitive rash, elevated or scarring rashes, prior hematologic abnormality, prior renal or liver disease, or history of seizures.  No history of prior blood clot in the legs or lungs, strokes or ischemic phenomenon.  Denies tightening of the skin, nonhealing ulcers on the fingertips, trouble swallowing, or severe acid reflux.  The patient denies any history of uveitis, crampy abdominal pain, diarrhea, bloody stools,  nodular painful shin bruises, psoriatic lesions, spooning or pitting of the nails, history of dactylitis, intermittent buttock pain, or pain awakening the patient from sleep.  There are no symptoms of severe dry eyes, dry mouth, recurrent cavities, or swelling of the cheeks or under the jawbone.   No fevers, chills, lymphadenopathy, night sweats, unexpected weight loss, bony pain, easy bruising or bleeding, or unexplained weakness.  Denies  chronic sinus infections/disease or epistaxis.  Denies chronic cough or hemoptysis.       Past Medical History:  Past Medical History:    Rheumatoid arthritis (HCC)     Past Surgical History:  History reviewed. No pertinent surgical history.  Family History:  History reviewed. No pertinent family history.  Social History:  Social History     Socioeconomic History    Marital status:    Tobacco Use    Smoking status: Never    Smokeless tobacco: Never   Substance and Sexual Activity    Alcohol use: Never    Drug use: Never     Medications:  Outpatient Medications Marked as Taking for the 8/12/24 encounter (Office Visit) with Rashmi Spring DO   Medication Sig Dispense Refill    hydroxychloroquine 200 MG Oral Tab Take 1 tablet (200 mg total) by mouth daily. 90 tablet 1     Modified Medications    No medications on file     There are no discontinued medications.    ?  ?  Allergies:  No Known Allergies  ?  REVIEW OF SYSTEMS   ?  Review of Systems   Constitutional:  Positive for malaise/fatigue (intermittently). Negative for chills and fever.   Eyes:  Negative for pain and redness.   Respiratory:  Negative for cough, hemoptysis and shortness of breath.    Cardiovascular:  Negative for chest pain, palpitations and leg swelling.   Gastrointestinal:  Positive for constipation. Negative for abdominal pain, blood in stool, diarrhea, heartburn and nausea.   Genitourinary:  Negative for dysuria, frequency, hematuria and urgency.   Musculoskeletal:  Positive for back pain and joint pain. Negative for myalgias and neck pain.   Skin:  Negative for itching and rash.   Neurological:  Negative for dizziness, tingling, sensory change, seizures, weakness and headaches.   Endo/Heme/Allergies:  Negative for environmental allergies. Does not bruise/bleed easily.   Psychiatric/Behavioral:  Negative for depression. The patient is not nervous/anxious and does not have insomnia.      PHYSICAL EXAM   Today's Vitals:  Temperature Blood  Pressure Heart Rate Resp Rate SpO2   Temp: 98 °F (36.7 °C) BP: 114/64 Pulse: 68 Resp: 16 SpO2: 99 %   ?  Current Weight Height BMI BSA Pain   Wt Readings from Last 1 Encounters:   08/12/24 152 lb (68.9 kg)    Height: 5' (152.4 cm) Body mass index is 29.69 kg/m². Body surface area is 1.66 meters squared.         Physical Exam  Vitals and nursing note reviewed.   Constitutional:       General: She is not in acute distress.     Appearance: She is well-developed. She is not diaphoretic.   HENT:      Head: Normocephalic.   Eyes:      General: No scleral icterus.     Extraocular Movements: Extraocular movements intact.      Conjunctiva/sclera: Conjunctivae normal.   Neck:      Vascular: No JVD.      Trachea: No tracheal deviation.   Cardiovascular:      Rate and Rhythm: Normal rate and regular rhythm.      Heart sounds: Normal heart sounds. No murmur heard.  Pulmonary:      Effort: Pulmonary effort is normal. No respiratory distress.      Breath sounds: Normal breath sounds. No wheezing.   Musculoskeletal:         General: Swelling, tenderness and deformity present.      Cervical back: Neck supple.      Comments: Right greater than left index DIP nodularities  Tailors bunion on left foot -stable  Bilateral middle finger MCP enlargement and swelling and tenderness.  Left index MCP slight swelling and no tenderness- improved   Early swan-neck deformities of multiple fingers  No swelling, tenderness, redness or restriction of motion of the DIPs, PIPs, remaining MCPs, wrists, elbows, ankles, or joints of the feet.  Bilateral shoulders with full ROM, no evidence of impingement with provocative maneuvers.  Bilateral knees without medial joint line tenderness, no crepitus, no effusion.   Lymphadenopathy:      Cervical: No cervical adenopathy.   Skin:     General: Skin is warm and dry.      Findings: Erythema and rash present.      Comments: Some redness over cheeks-with raised bumps.  Crosses over nasolabial folds  No periungal  erythema  No fingernail pitting/onycholysis    Neurological:      Mental Status: She is alert and oriented to person, place, and time.      Cranial Nerves: No cranial nerve deficit.      Gait: Gait normal.   Psychiatric:         Mood and Affect: Mood normal.         Behavior: Behavior normal.         Radiology review:      CLINICAL HISTORY: M79.641: Pain in both hands  M79.642: Pain in both hands  M79.671: Bilateral foot pain  M79.672: Bilateral foot pain    TECHNIQUE:  3 views of the left foot, 3 views of the right foot, 3 views of the left hand, performed on 05/16/2022    No prior studies available for comparison    The left hand films demonstrate normal bone density for age. There is mild sclerosis about the radial scaphoid, triscaphe, first CMC, DIP joints. There are no erosions. There is no chondrocalcinosis. There is mild soft tissue swelling about the index finger. There is no fracture or subluxation.    The foot films demonstrate very mild sclerosis and narrowing of the tarsometatarsal, first MTP and IP joints bilaterally. There are no erosions. There is no chondrocalcinosis. There is no acute fracture or subluxation. There is mild plantar and Achilles calcaneal spur formation on the left.    IMPRESSION  IMPRESSION:    1. 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    2. Mild bilateral osteoarthritis of the feet without an erosive or crystal component evident.      Recommendation: n/a  Electronically Verified and Signed by Attending Radiologist: Martin Lazarus MD 5/16/2022 5:03 PM  This exam was dictated at [facility].  Exam End: 05/16/22 12:29 PM    Specimen Collected: 05/16/22  4:59 PM Last Resulted: 05/16/22  5:03 PM     Labs:  Lab Results   Component Value Date    WBC 7.5 05/21/2024    RBC 4.20 05/21/2024    HGB 12.0 05/21/2024    HCT 36.0 05/21/2024    .0 05/21/2024    MCV 85.7 05/21/2024    MCH 28.6 05/21/2024    MCHC 33.3  05/21/2024    RDW 12.9 05/21/2024    NEPRELIM 3.90 05/21/2024    NEPERCENT 52.1 05/21/2024    LYPERCENT 29.6 05/21/2024    MOPERCENT 6.6 05/21/2024    EOPERCENT 10.7 05/21/2024    BAPERCENT 0.7 05/21/2024    NE 3.90 05/21/2024    LYMABS 2.21 05/21/2024    MOABSO 0.49 05/21/2024    EOABSO 0.80 (H) 05/21/2024    BAABSO 0.05 05/21/2024     Lab Results   Component Value Date    GLU 90 05/21/2024    BUN 5 (L) 05/21/2024    CREATSERUM 0.63 05/21/2024    ANIONGAP 3 05/21/2024    CA 9.1 05/21/2024    OSMOCALC 287 05/21/2024    ALKPHO 62 05/21/2024    AST 8 (L) 05/21/2024    ALT 17 05/21/2024    BILT 0.4 05/21/2024    TP 7.4 05/21/2024    ALB 3.4 05/21/2024    GLOBULIN 4.0 05/21/2024     05/21/2024    K 4.0 05/21/2024     05/21/2024    CO2 29.0 05/21/2024       Additional Labs:    05/2024  ESR 28 borderline elevated  CMP grossly normal  CBC grossly normal  Vitamin D 31.5 borderline  Cardiolipin IgM indeterminant; IgG negative  Beta-2 glycoprotein IgG, IgM negative  Lupus anticoagulant negative    11/2023  CBC grossly normal  CMP grossly normal  ESR 39 elevated  CRP normal  B12 536 normal  Vitamin D 25.1 low  AVISE with RF IgM, IgA and positive; CCP strong positive; B2 G IgM equivocal otherwise grossly negative antibody panel.  Methotrexate levels intermediate (43.5 nmol/L per evaluation recommends more exposure to methotrexate.    04/2023  C3 135 normal  C4 25 normal   CRP 0.47 (N<0.5)   ESR 33 elevated     11/2022  ANGELICA 1:160 homogenous and 1:80 speckled     05/2022  ESR 41 elevated  hsCRP 5.3 (N<3) elevated   .1 high positive   .2 high positive   dsDNA, chromatin, ribosomal P, SSA, SSB, centromere, Sm, RNP/SM, RNP, Scl70, Jo1 negative  Vit D 17 low   Hep B serologies nonreactive  HCV nonreactive           Rashmi Spring DO  [facility]  08/12/2024

## 2024-08-12 NOTE — PATIENT INSTRUCTIONS
-- get updated labs (no fasting required)  -- continue hydroxychloroquine 200mg daily (remember, based off your weight, we may consider increasing in the future if the symptoms worsen)  -- remember to get updated eye exam (this needs to be annual to monitor for toxicities from the medication)  -- get xrays of the back to evaluate for scoliosis, will consider physical therapy  -- for the dry eyes, try warm compresses or OTC drops like systane, refresh, theratears or blink drops  -- okay to try biofreeze or voltaren (diclofenac) gel  -- for the constipation, okay to take metamucil, miralax or dulcolax as needed but check if there are more changes in diet (water intake, probiotics/yogurt, and fiber)   -- follow up in 4 months or sooner   -- call/message with questions/concerns    Dr. Spring

## 2025-04-13 SDOH — ECONOMIC STABILITY: FOOD INSECURITY: WITHIN THE PAST 12 MONTHS, THE FOOD YOU BOUGHT JUST DIDN'T LAST AND YOU DIDN'T HAVE MONEY TO GET MORE.: NEVER TRUE

## 2025-04-13 SDOH — ECONOMIC STABILITY: HOUSING INSECURITY: WHAT IS YOUR LIVING SITUATION TODAY?: I HAVE A STEADY PLACE TO LIVE

## 2025-04-13 SDOH — ECONOMIC STABILITY: HOUSING INSECURITY: DO YOU HAVE PROBLEMS WITH ANY OF THE FOLLOWING?: NONE OF THE ABOVE

## 2025-04-13 SDOH — ECONOMIC STABILITY: TRANSPORTATION INSECURITY
IN THE PAST 12 MONTHS, HAS LACK OF RELIABLE TRANSPORTATION KEPT YOU FROM MEDICAL APPOINTMENTS, MEETINGS, WORK OR FROM GETTING THINGS NEEDED FOR DAILY LIVING?: NO

## 2025-04-13 ASSESSMENT — SOCIAL DETERMINANTS OF HEALTH (SDOH): IN THE PAST 12 MONTHS, HAS THE ELECTRIC, GAS, OIL, OR WATER COMPANY THREATENED TO SHUT OFF SERVICE IN YOUR HOME?: NO

## 2025-04-14 ENCOUNTER — APPOINTMENT (OUTPATIENT)
Dept: OBGYN | Age: 50
End: 2025-04-14

## 2025-04-14 ENCOUNTER — LAB SERVICES (OUTPATIENT)
Dept: LAB | Age: 50
End: 2025-04-14

## 2025-04-14 VITALS
BODY MASS INDEX: 28.16 KG/M2 | WEIGHT: 153 LBS | HEIGHT: 62 IN | DIASTOLIC BLOOD PRESSURE: 60 MMHG | TEMPERATURE: 98 F | SYSTOLIC BLOOD PRESSURE: 100 MMHG | HEART RATE: 60 BPM

## 2025-04-14 DIAGNOSIS — T73.0XXA HUNGRY, INITIAL ENCOUNTER: ICD-10-CM

## 2025-04-14 DIAGNOSIS — N92.6 MISSED PERIODS: Primary | ICD-10-CM

## 2025-04-14 DIAGNOSIS — R42 LIGHT HEADED: ICD-10-CM

## 2025-04-14 DIAGNOSIS — N92.6 MISSED PERIODS: ICD-10-CM

## 2025-04-14 LAB
B-HCG UR QL: NEGATIVE
FSH SERPL-ACNC: 12 MUNITS/ML
INTERNAL PROCEDURAL CONTROLS ACCEPTABLE: YES
TEST LOT EXPIRATION DATE: NORMAL
TEST LOT NUMBER: NORMAL

## 2025-04-14 PROCEDURE — 36415 COLL VENOUS BLD VENIPUNCTURE: CPT | Performed by: NURSE PRACTITIONER

## 2025-04-14 PROCEDURE — 83001 ASSAY OF GONADOTROPIN (FSH): CPT | Performed by: CLINICAL MEDICAL LABORATORY

## 2025-04-14 PROCEDURE — 84443 ASSAY THYROID STIM HORMONE: CPT | Performed by: INTERNAL MEDICINE

## 2025-04-14 PROCEDURE — 99204 OFFICE O/P NEW MOD 45 MIN: CPT | Performed by: NURSE PRACTITIONER

## 2025-04-14 PROCEDURE — 83036 HEMOGLOBIN GLYCOSYLATED A1C: CPT | Performed by: INTERNAL MEDICINE

## 2025-04-14 PROCEDURE — 81025 URINE PREGNANCY TEST: CPT | Performed by: NURSE PRACTITIONER

## 2025-04-14 RX ORDER — HYDROXYCHLOROQUINE SULFATE 200 MG/1
TABLET, FILM COATED ORAL
COMMUNITY
Start: 2024-08-01

## 2025-04-15 LAB
HBA1C MFR BLD: 6.4 % (ref 4.5–5.6)
TSH SERPL-ACNC: 2.44 MCUNITS/ML (ref 0.35–5)

## 2025-04-22 ENCOUNTER — APPOINTMENT (OUTPATIENT)
Dept: FAMILY MEDICINE | Age: 50
End: 2025-04-22

## 2025-04-25 ENCOUNTER — E-ADVICE (OUTPATIENT)
Dept: OBGYN | Age: 50
End: 2025-04-25

## 2025-05-06 ENCOUNTER — APPOINTMENT (OUTPATIENT)
Dept: FAMILY MEDICINE | Age: 50
End: 2025-05-06

## 2025-05-06 VITALS
DIASTOLIC BLOOD PRESSURE: 72 MMHG | HEART RATE: 58 BPM | HEIGHT: 62 IN | WEIGHT: 154 LBS | TEMPERATURE: 97.9 F | SYSTOLIC BLOOD PRESSURE: 102 MMHG | OXYGEN SATURATION: 100 % | BODY MASS INDEX: 28.34 KG/M2

## 2025-05-06 DIAGNOSIS — E55.9 VITAMIN D DEFICIENCY: ICD-10-CM

## 2025-05-06 DIAGNOSIS — M05.9 SEROPOSITIVE RHEUMATOID ARTHRITIS (CMD): ICD-10-CM

## 2025-05-06 DIAGNOSIS — R21 SKIN RASH: ICD-10-CM

## 2025-05-06 DIAGNOSIS — N92.6 IRREGULAR MENSES: ICD-10-CM

## 2025-05-06 DIAGNOSIS — D22.9 NUMEROUS MOLES: ICD-10-CM

## 2025-05-06 DIAGNOSIS — Z12.11 COLON CANCER SCREENING: ICD-10-CM

## 2025-05-06 DIAGNOSIS — Z00.00 ENCOUNTER FOR GENERAL ADULT MEDICAL EXAMINATION W/O ABNORMAL FINDINGS: Primary | ICD-10-CM

## 2025-05-06 PROBLEM — R53.82 CHRONIC FATIGUE: Status: ACTIVE | Noted: 2025-05-06

## 2025-05-06 PROBLEM — H04.123 DRY EYE SYNDROME OF BOTH EYES: Status: ACTIVE | Noted: 2025-05-06

## 2025-05-06 PROBLEM — R76.0 ANTIPHOSPHOLIPID ANTIBODY POSITIVE: Status: ACTIVE | Noted: 2025-05-06

## 2025-05-06 PROBLEM — I73.00 RAYNAUD'S DISEASE WITHOUT GANGRENE: Status: ACTIVE | Noted: 2025-05-06

## 2025-05-06 PROCEDURE — 99204 OFFICE O/P NEW MOD 45 MIN: CPT | Performed by: HOSPITALIST

## 2025-05-06 PROCEDURE — 99386 PREV VISIT NEW AGE 40-64: CPT | Performed by: HOSPITALIST

## 2025-05-06 RX ORDER — MULTIVITAMIN WITH IRON
100 TABLET ORAL DAILY
COMMUNITY

## 2025-05-06 ASSESSMENT — ENCOUNTER SYMPTOMS
NERVOUS/ANXIOUS: 0
CONSTIPATION: 0
DIZZINESS: 0
WHEEZING: 0
SHORTNESS OF BREATH: 0
APPETITE CHANGE: 0
DIAPHORESIS: 0
WEAKNESS: 0
ABDOMINAL PAIN: 0
BACK PAIN: 0
BLOOD IN STOOL: 0
CHILLS: 0
FEVER: 0
NUMBNESS: 0
SORE THROAT: 0
COUGH: 0
DIARRHEA: 0

## 2025-05-06 ASSESSMENT — PATIENT HEALTH QUESTIONNAIRE - PHQ9
2. FEELING DOWN, DEPRESSED OR HOPELESS: NOT AT ALL
CLINICAL INTERPRETATION OF PHQ2 SCORE: NO FURTHER SCREENING NEEDED
1. LITTLE INTEREST OR PLEASURE IN DOING THINGS: NOT AT ALL
SUM OF ALL RESPONSES TO PHQ9 QUESTIONS 1 AND 2: 0
SUM OF ALL RESPONSES TO PHQ9 QUESTIONS 1 AND 2: 0

## 2025-05-07 ENCOUNTER — TELEPHONE (OUTPATIENT)
Dept: GASTROENTEROLOGY | Age: 50
End: 2025-05-07

## 2025-05-07 ENCOUNTER — E-ADVICE (OUTPATIENT)
Dept: RHEUMATOLOGY | Age: 50
End: 2025-05-07

## 2025-05-14 ENCOUNTER — TELEPHONE (OUTPATIENT)
Dept: GASTROENTEROLOGY | Age: 50
End: 2025-05-14

## 2025-05-15 ENCOUNTER — TELEPHONE (OUTPATIENT)
Dept: FAMILY MEDICINE | Age: 50
End: 2025-05-15

## 2025-05-15 ENCOUNTER — E-ADVICE (OUTPATIENT)
Dept: FAMILY MEDICINE | Age: 50
End: 2025-05-15

## 2025-06-03 ENCOUNTER — APPOINTMENT (OUTPATIENT)
Dept: MAMMOGRAPHY | Age: 50
End: 2025-06-03

## 2025-06-03 DIAGNOSIS — Z12.31 ENCOUNTER FOR SCREENING MAMMOGRAM FOR MALIGNANT NEOPLASM OF BREAST: ICD-10-CM

## 2025-06-03 PROCEDURE — 77067 SCR MAMMO BI INCL CAD: CPT | Performed by: RADIOLOGY

## 2025-06-03 PROCEDURE — 77063 BREAST TOMOSYNTHESIS BI: CPT | Performed by: RADIOLOGY

## 2025-06-09 ENCOUNTER — RESULTS FOLLOW-UP (OUTPATIENT)
Dept: FAMILY MEDICINE | Age: 50
End: 2025-06-09

## (undated) NOTE — Clinical Note
Hi, Dr. London Wyatt! Thank you for referring Ms. Solomon Tadeo for rheumatologic evaluation. Please see the discussion portion of my note and let me know if you have any questions.    Bartlett Ganser,  [facility] 11/6/2023